# Patient Record
Sex: MALE | Race: WHITE | URBAN - METROPOLITAN AREA
[De-identification: names, ages, dates, MRNs, and addresses within clinical notes are randomized per-mention and may not be internally consistent; named-entity substitution may affect disease eponyms.]

---

## 2017-02-23 PROBLEM — M79.605 PAIN IN BOTH LOWER EXTREMITIES: Status: ACTIVE | Noted: 2017-02-23

## 2017-02-23 PROBLEM — M79.604 PAIN IN BOTH LOWER EXTREMITIES: Status: ACTIVE | Noted: 2017-02-23

## 2019-06-20 ENCOUNTER — OFFICE VISIT (OUTPATIENT)
Dept: PRIMARY CARE CLINIC | Age: 39
End: 2019-06-20
Payer: COMMERCIAL

## 2019-06-20 VITALS
WEIGHT: 204.8 LBS | SYSTOLIC BLOOD PRESSURE: 116 MMHG | DIASTOLIC BLOOD PRESSURE: 78 MMHG | BODY MASS INDEX: 27.02 KG/M2 | HEART RATE: 62 BPM | OXYGEN SATURATION: 98 %

## 2019-06-20 DIAGNOSIS — F43.9 SITUATIONAL STRESS: ICD-10-CM

## 2019-06-20 DIAGNOSIS — F43.23 ADJUSTMENT DISORDER WITH MIXED ANXIETY AND DEPRESSED MOOD: ICD-10-CM

## 2019-06-20 PROCEDURE — 99213 OFFICE O/P EST LOW 20 MIN: CPT | Performed by: PHYSICIAN ASSISTANT

## 2019-06-20 ASSESSMENT — ENCOUNTER SYMPTOMS
WHEEZING: 0
VOMITING: 0
DIARRHEA: 0
CHEST TIGHTNESS: 1
BACK PAIN: 0
NAUSEA: 0
CONSTIPATION: 0
SHORTNESS OF BREATH: 0

## 2019-06-20 ASSESSMENT — PATIENT HEALTH QUESTIONNAIRE - PHQ9
SUM OF ALL RESPONSES TO PHQ9 QUESTIONS 1 & 2: 0
SUM OF ALL RESPONSES TO PHQ QUESTIONS 1-9: 0
SUM OF ALL RESPONSES TO PHQ QUESTIONS 1-9: 0
1. LITTLE INTEREST OR PLEASURE IN DOING THINGS: 0
2. FEELING DOWN, DEPRESSED OR HOPELESS: 0

## 2019-06-20 NOTE — PROGRESS NOTES
[facility] PRIMARY CARE  [address] [address] [facility]  Dept: [phone]    Chase Rose is a 34 y.o. male who presents today for his medical conditions/complaintsas noted below. Chief Complaint   Patient presents with    Anxiety     has been dealing with this for 6-7 months. states wife has it, he feels he sees the signs in himself now. HPI:     HPI  Patient of Dr. Vincent Kin seen today with newly recognized anxiety symptoms. 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 Tried his wife's generic Zoloft 100mg just one pill and is convinced that it helped him tremendously. Scanned surveys do show depression and anxiety. He states he lacks confidence, has leg and stomach tightening, can't focus and mind is always thinking about \" a million things\" . Denies self treatment with ETOH or rec drugs. Denies suicidal ideation. LDL Calculated (mg/dL)   Date Value   09/20/2018 135 (H)       (goal LDL is <100)   BUN (mg/dL)   Date Value   09/20/2018 23     BP Readings from Last 3 Encounters:   06/20/19 116/78   12/29/17 124/76   04/06/17 110/80          (goal 120/80)    No past medical history on file.    Past Surgical History:   Procedure Laterality Date    HERNIA REPAIR  09/2017       Family History   Problem Relation Age of Onset    High Cholesterol Father     High Cholesterol Brother     Cancer Paternal Grandfather        Social History     Tobacco Use    Smoking status: Never Smoker    Smokeless tobacco: Never Used   Substance Use Topics    Alcohol use: Yes     Comment: weekends      Current Outpatient Medications   Medication Sig Dispense Refill    sertraline (ZOLOFT) 50 MG tablet Take one tablet qhs for 1 week then 2 tablets qhs 60 tablet 0    Multiple Vitamins-Minerals (THERAPEUTIC MULTIVITAMIN-MINERALS) tablet Take 1 tablet by mouth daily       No current facility-administered medications for this visit. Allergies   Allergen Reactions    Lactose Intolerance (Gi)        Health Maintenance   Topic Date Due    HIV screen  09/18/1993    Flu vaccine (Season Ended) 09/01/2019    Lipid screen  09/20/2023    DTaP/Tdap/Td vaccine (2 - Td) 10/05/2028    Pneumococcal 0-64 years Vaccine  Aged Out       Subjective:      Review of Systems   Constitutional: Negative for activity change, appetite change and fatigue. Respiratory: Positive for chest tightness. Negative for shortness of breath and wheezing. Cardiovascular: Negative for chest pain and palpitations. Gastrointestinal: Negative for constipation, diarrhea, nausea and vomiting. Endocrine: Negative for cold intolerance and heat intolerance. Musculoskeletal: Positive for arthralgias and myalgias. Negative for back pain and neck pain. Neurological: Negative for dizziness, seizures, syncope, weakness, light-headedness and headaches. Psychiatric/Behavioral: Positive for dysphoric mood. Negative for agitation, behavioral problems, confusion, decreased concentration, hallucinations, self-injury, sleep disturbance and suicidal ideas. The patient is nervous/anxious. The patient is not hyperactive. Objective:     /78   Pulse 62   Wt 204 lb 12.8 oz (92.9 kg)   SpO2 98%   BMI 27.02 kg/m²   Physical Exam   Constitutional: He appears well-developed and well-nourished. No distress. Cardiovascular: Normal rate, regular rhythm and normal heart sounds. Pulmonary/Chest: Effort normal and breath sounds normal.   Vitals reviewed. Assessment:       Diagnosis Orders   1. Situational stress     2. Adjustment disorder with mixed anxiety and depressed mood          Plan:    Start Zoloft 50mg 1 po qhs and after 1 week, increase to 100mg   Get back to regular exercise   Consider counseling but he wants to wait.     Return in about 1 month (around 7/20/2019), or if symptoms worsen or fail to improve, for with me or Dr. Matias Wheeler. No orders of the defined types were placed in this encounter. Orders Placed This Encounter   Medications    sertraline (ZOLOFT) 50 MG tablet     Sig: Take one tablet qhs for 1 week then 2 tablets qhs     Dispense:  60 tablet     Refill:  0       Patient given educationalmaterials - see patient instructions. Discussed use, benefit, and side effectsof prescribed medications. All patient questions answered. Pt voiced understanding. Reviewed health maintenance. Instructed to continue current medications, diet andexercise. Patient agreed with treatment plan. Follow up as directed.      Electronicallysigned by Glenis Florez PA-C on 6/20/2019 at 11:16 AM

## 2019-07-16 ENCOUNTER — OFFICE VISIT (OUTPATIENT)
Dept: PRIMARY CARE CLINIC | Age: 39
End: 2019-07-16
Payer: COMMERCIAL

## 2019-07-16 VITALS
SYSTOLIC BLOOD PRESSURE: 116 MMHG | WEIGHT: 203.4 LBS | BODY MASS INDEX: 26.84 KG/M2 | OXYGEN SATURATION: 98 % | HEART RATE: 62 BPM | DIASTOLIC BLOOD PRESSURE: 84 MMHG

## 2019-07-16 DIAGNOSIS — F43.23 ADJUSTMENT DISORDER WITH MIXED ANXIETY AND DEPRESSED MOOD: ICD-10-CM

## 2019-07-16 DIAGNOSIS — Z79.899 MEDICATION MANAGEMENT: Primary | ICD-10-CM

## 2019-07-16 DIAGNOSIS — F43.9 SITUATIONAL STRESS: ICD-10-CM

## 2019-07-16 PROCEDURE — 99213 OFFICE O/P EST LOW 20 MIN: CPT | Performed by: PHYSICIAN ASSISTANT

## 2019-07-16 ASSESSMENT — ENCOUNTER SYMPTOMS
NAUSEA: 0
VOMITING: 0
BACK PAIN: 0
SHORTNESS OF BREATH: 0

## 2019-07-16 NOTE — PROGRESS NOTES
chest pain. Gastrointestinal: Negative for nausea and vomiting. Endocrine: Negative for cold intolerance and heat intolerance. Musculoskeletal: Negative for arthralgias, back pain, myalgias and neck pain. Neurological: Negative for dizziness, seizures, syncope, weakness, light-headedness and headaches. Psychiatric/Behavioral: Negative for agitation, behavioral problems, confusion, decreased concentration, dysphoric mood, hallucinations, self-injury, sleep disturbance and suicidal ideas. The patient is not nervous/anxious and is not hyperactive. Objective:     /84   Pulse 62   Wt 203 lb 6.4 oz (92.3 kg)   SpO2 98%   BMI 26.84 kg/m²   Physical Exam   Constitutional: He is oriented to person, place, and time. He appears well-developed and well-nourished. No distress. Cardiovascular: Normal rate, regular rhythm and normal heart sounds. Pulmonary/Chest: Effort normal and breath sounds normal.   Neurological: He is alert and oriented to person, place, and time. Psychiatric: He has a normal mood and affect. His speech is normal and behavior is normal. Judgment and thought content normal. Cognition and memory are normal.   Vitals reviewed. Assessment:       Diagnosis Orders   1. Medication management     2. Situational stress     3. Adjustment disorder with mixed anxiety and depressed mood          Plan:    Continue the Zoloft daily for 6 months and will review at that time. Return if symptoms worsen or fail to improve. No orders of the defined types were placed in this encounter. Orders Placed This Encounter   Medications    sertraline (ZOLOFT) 50 MG tablet     Sig: Take 2 tablets by mouth every evening     Dispense:  60 tablet     Refill:  5       Patient given educationalmaterials - see patient instructions. Discussed use, benefit, and side effectsof prescribed medications. All patient questions answered. Pt voiced understanding. Reviewed health maintenance.   Instructed to

## 2020-01-16 ENCOUNTER — OFFICE VISIT (OUTPATIENT)
Dept: PRIMARY CARE CLINIC | Age: 40
End: 2020-01-16
Payer: COMMERCIAL

## 2020-01-16 VITALS
SYSTOLIC BLOOD PRESSURE: 118 MMHG | HEIGHT: 73 IN | BODY MASS INDEX: 27.73 KG/M2 | WEIGHT: 209.2 LBS | HEART RATE: 65 BPM | OXYGEN SATURATION: 97 % | DIASTOLIC BLOOD PRESSURE: 76 MMHG

## 2020-01-16 PROCEDURE — 99213 OFFICE O/P EST LOW 20 MIN: CPT | Performed by: PHYSICIAN ASSISTANT

## 2020-01-16 RX ORDER — SERTRALINE HYDROCHLORIDE 100 MG/1
100 TABLET, FILM COATED ORAL EVERY EVENING
Qty: 30 TABLET | Refills: 11 | Status: SHIPPED | OUTPATIENT
Start: 2020-01-16 | End: 2021-09-30 | Stop reason: SDUPTHER

## 2020-01-16 ASSESSMENT — PATIENT HEALTH QUESTIONNAIRE - PHQ9
SUM OF ALL RESPONSES TO PHQ QUESTIONS 1-9: 2
SUM OF ALL RESPONSES TO PHQ9 QUESTIONS 1 & 2: 2
1. LITTLE INTEREST OR PLEASURE IN DOING THINGS: 1
SUM OF ALL RESPONSES TO PHQ QUESTIONS 1-9: 2
2. FEELING DOWN, DEPRESSED OR HOPELESS: 1

## 2020-01-16 ASSESSMENT — ENCOUNTER SYMPTOMS
RESPIRATORY NEGATIVE: 1
RHINORRHEA: 0
SINUS PAIN: 0
SINUS PRESSURE: 0
SORE THROAT: 0
EYES NEGATIVE: 1

## 2020-01-16 NOTE — PROGRESS NOTES
[address] [facility] CARE  [address] [place] [id number]  Dept: [phone] is a 37 y.o. male who presents today for his medical conditions/complaintsas noted below. Chief Complaint   Patient presents with    6 Month Follow-Up     patient said the Zoloft is working well for him. HPI:     HPI  Has an up and down feeling when he forgets the pill which is not often. He wants to continue Zoloft     Having pressure in right ear. Hearing is ok. Had a viral illness over the Christmas holiday. No pain or fever. No discharge      LDL Calculated (mg/dL)   Date Value   09/20/2018 135 (H)       (goal LDL is <100)   BUN (mg/dL)   Date Value   09/20/2018 23     BP Readings from Last 3 Encounters:   01/16/20 118/76   07/16/19 116/84   06/20/19 116/78          (goal 120/80)    No past medical history on file. Past Surgical History:   Procedure Laterality Date    HERNIA REPAIR  09/2017       Family History   Problem Relation Age of Onset    High Cholesterol Father     High Cholesterol Brother     Cancer Paternal Grandfather        Social History     Tobacco Use    Smoking status: Never Smoker    Smokeless tobacco: Never Used   Substance Use Topics    Alcohol use: Yes     Comment: weekends      Current Outpatient Medications   Medication Sig Dispense Refill    sertraline (ZOLOFT) 100 MG tablet Take 1 tablet by mouth every evening 30 tablet 11    Multiple Vitamins-Minerals (THERAPEUTIC MULTIVITAMIN-MINERALS) tablet Take 1 tablet by mouth daily       No current facility-administered medications for this visit.       Allergies   Allergen Reactions    Lactose Intolerance (Gi)        Health Maintenance   Topic Date Due    HIV screen  09/18/1993    Flu vaccine (1) 01/16/2021 (Originally 9/1/2019)    Lipid screen  09/20/2023    DTaP/Tdap/Td vaccine (2 - Td) 10/05/2028    Pneumococcal 0-64 years Vaccine  Aged Out       Subjective:      Review of Systems   Constitutional: Negative for chills, diaphoresis and fever. HENT: Positive for ear pain. Negative for congestion, dental problem, ear discharge, postnasal drip, rhinorrhea, sinus pressure, sinus pain, sore throat and tinnitus. Hearing loss: right feels plugged. Eyes: Negative. Respiratory: Negative. Cardiovascular: Negative. Neurological: Negative for dizziness, light-headedness and headaches. Psychiatric/Behavioral: Negative. Objective:     /76   Pulse 65   Ht 6' 1\" (1.854 m)   Wt 209 lb 3.2 oz (94.9 kg)   SpO2 97%   BMI 27.60 kg/m²   Physical Exam  Vitals signs and nursing note reviewed. HENT:      Right Ear: Ear canal and external ear normal. A middle ear effusion is present. Tympanic membrane is not injected, perforated, erythematous, retracted or bulging. Left Ear: Tympanic membrane, ear canal and external ear normal.   Cardiovascular:      Rate and Rhythm: Normal rate and regular rhythm. Heart sounds: Normal heart sounds. Pulmonary:      Effort: Pulmonary effort is normal.      Breath sounds: Normal breath sounds. Psychiatric:         Mood and Affect: Mood normal.         Behavior: Behavior normal.         Thought Content: Thought content normal.         Judgment: Judgment normal.         Assessment:       Diagnosis Orders   1. Medication management     2. Adjustment disorder with mixed anxiety and depressed mood     3. Situational stress     4. Ear pressure, right          Plan:    List of counselors given--suggested Salud Webb, PhD  Refilled Zoloft   Take Claritin daily for 2-3 weeks and if ear is not improving, RTO. Return in about 1 year (around 1/16/2021) for Physical.    No orders of the defined types were placed in this encounter.     Orders Placed This Encounter   Medications    sertraline (ZOLOFT) 100 MG tablet     Sig: Take 1 tablet by mouth every evening     Dispense:  30 tablet     Refill:  11       Patient given educationalmaterials - see patient instructions. Discussed use, benefit, and side effectsof prescribed medications. All patient questions answered. Pt voiced understanding. Reviewed health maintenance. Instructed to continue current medications, diet andexercise. Patient agreed with treatment plan. Follow up as directed.      Electronicallysigned by Emerita Webb PA-C on 1/16/2020 at 10:47 AM

## 2021-01-28 RX ORDER — SERTRALINE HYDROCHLORIDE 100 MG/1
100 TABLET, FILM COATED ORAL EVERY EVENING
Qty: 30 TABLET | Refills: 11 | OUTPATIENT
Start: 2021-01-28

## 2021-09-22 ENCOUNTER — OFFICE VISIT (OUTPATIENT)
Dept: PRIMARY CARE CLINIC | Age: 41
End: 2021-09-22
Payer: COMMERCIAL

## 2021-09-22 VITALS
DIASTOLIC BLOOD PRESSURE: 86 MMHG | HEIGHT: 73 IN | HEART RATE: 57 BPM | WEIGHT: 211.8 LBS | SYSTOLIC BLOOD PRESSURE: 136 MMHG | BODY MASS INDEX: 28.07 KG/M2 | OXYGEN SATURATION: 97 %

## 2021-09-22 DIAGNOSIS — M54.6 ACUTE RIGHT-SIDED THORACIC BACK PAIN: ICD-10-CM

## 2021-09-22 DIAGNOSIS — M54.12 CERVICAL RADICULOPATHY: Primary | ICD-10-CM

## 2021-09-22 PROCEDURE — 99213 OFFICE O/P EST LOW 20 MIN: CPT | Performed by: PHYSICIAN ASSISTANT

## 2021-09-22 RX ORDER — CYCLOBENZAPRINE HCL 5 MG
5 TABLET ORAL 3 TIMES DAILY PRN
Qty: 30 TABLET | Refills: 0 | Status: SHIPPED | OUTPATIENT
Start: 2021-09-22 | End: 2021-10-02

## 2021-09-22 RX ORDER — METHYLPREDNISOLONE 4 MG/1
TABLET ORAL
Qty: 1 KIT | Refills: 0 | Status: SHIPPED | OUTPATIENT
Start: 2021-09-22 | End: 2021-09-28

## 2021-09-22 SDOH — ECONOMIC STABILITY: FOOD INSECURITY: WITHIN THE PAST 12 MONTHS, YOU WORRIED THAT YOUR FOOD WOULD RUN OUT BEFORE YOU GOT MONEY TO BUY MORE.: NEVER TRUE

## 2021-09-22 SDOH — ECONOMIC STABILITY: FOOD INSECURITY: WITHIN THE PAST 12 MONTHS, THE FOOD YOU BOUGHT JUST DIDN'T LAST AND YOU DIDN'T HAVE MONEY TO GET MORE.: NEVER TRUE

## 2021-09-22 ASSESSMENT — PATIENT HEALTH QUESTIONNAIRE - PHQ9
SUM OF ALL RESPONSES TO PHQ QUESTIONS 1-9: 0
SUM OF ALL RESPONSES TO PHQ QUESTIONS 1-9: 0
SUM OF ALL RESPONSES TO PHQ9 QUESTIONS 1 & 2: 0
2. FEELING DOWN, DEPRESSED OR HOPELESS: 0
1. LITTLE INTEREST OR PLEASURE IN DOING THINGS: 0
SUM OF ALL RESPONSES TO PHQ QUESTIONS 1-9: 0

## 2021-09-22 ASSESSMENT — SOCIAL DETERMINANTS OF HEALTH (SDOH): HOW HARD IS IT FOR YOU TO PAY FOR THE VERY BASICS LIKE FOOD, HOUSING, MEDICAL CARE, AND HEATING?: NOT HARD AT ALL

## 2021-09-22 NOTE — PROGRESS NOTES
[address] [facility] PRIMARY CARE  [address] [address]  Dept: [phone]ardo is a 35 y.o. male Established patient, who presents today for his medical conditions/complaints as noted below. Chief Complaint   Patient presents with    Neck Pain     x 1 1/2 weeks       HPI:     HPI   Refused influenza vaccine. Denies previous hx of neck pain. Had pain for 1.5 weeks now. He is a  and thinks the pain may be from painting high overhead areas. Pain is on the right side of his neck and goes to the posterior Rt shoulder area. Can't get comfortable. Says his Rt thumb goes numb sometimes. C/o decreased neck ROM. Right hand dominant. Noticed pain increases when he is holding things in his right hand. Saw a masseuse and chiropractor without relief. Topical cream from the chiropractor did not help either. Heat helps, but cold does not. Taking ibuprofen 600 mg every 6-8 hrs, which helps. Reviewed prior notes None  Reviewed previous Labs- BMP from 2018    LDL Calculated (mg/dL)   Date Value   09/20/2018 135 (H)       (goal LDL is <100)   BUN (mg/dL)   Date Value   09/20/2018 23     TSH (uIU/mL)   Date Value   09/20/2018 1.12     BP Readings from Last 3 Encounters:   09/22/21 136/86   01/16/20 118/76   07/16/19 116/84          (goal 120/80)    No past medical history on file.    Past Surgical History:   Procedure Laterality Date    HERNIA REPAIR  09/2017       Family History   Problem Relation Age of Onset    High Cholesterol Father     High Cholesterol Brother     Cancer Paternal Grandfather        Social History     Tobacco Use    Smoking status: Never Smoker    Smokeless tobacco: Never Used   Substance Use Topics    Alcohol use: Yes     Comment: weekends      Current Outpatient Medications   Medication Sig Dispense Refill    methylPREDNISolone (MEDROL, RON,) 4 MG tablet Take as directed 1 kit 0    cyclobenzaprine (FLEXERIL) 5 MG tablet Take 1 tablet by mouth 3 times daily as needed for Muscle spasms 30 tablet 0    sertraline (ZOLOFT) 100 MG tablet Take 1 tablet by mouth every evening 30 tablet 11    Multiple Vitamins-Minerals (THERAPEUTIC MULTIVITAMIN-MINERALS) tablet Take 1 tablet by mouth daily       No current facility-administered medications for this visit. Allergies   Allergen Reactions    Lactose Intolerance (Gi)        Health Maintenance   Topic Date Due    Hepatitis C screen  Never done    COVID-19 Vaccine (1) Never done    HIV screen  Never done    Diabetes screen  Never done    Flu vaccine (1) 09/28/2022 (Originally 9/1/2021)    Lipid screen  09/20/2023    DTaP/Tdap/Td vaccine (2 - Td or Tdap) 10/05/2028    Hepatitis A vaccine  Aged Out    Hepatitis B vaccine  Aged Out    Hib vaccine  Aged Out    Meningococcal (ACWY) vaccine  Aged Out    Pneumococcal 0-64 years Vaccine  Aged Out       Subjective:      Review of Systems   Musculoskeletal: Positive for neck pain. Neurological: Positive for numbness (right thumb). Objective:     /86   Pulse 57   Ht 6' 1\" (1.854 m)   Wt 211 lb 12.8 oz (96.1 kg)   SpO2 97%   BMI 27.94 kg/m²   Physical Exam  Vitals and nursing note reviewed. Constitutional:       Appearance: Normal appearance. HENT:      Head: Normocephalic and atraumatic. Cardiovascular:      Rate and Rhythm: Regular rhythm. Heart sounds: Normal heart sounds. Pulmonary:      Effort: Pulmonary effort is normal.      Breath sounds: Normal breath sounds. Musculoskeletal:      Right shoulder: Bony tenderness (on right scapula, but not on the [facility] joint) present. Normal range of motion. Cervical back: No bony tenderness. Thoracic back: No bony tenderness. Back:       Comments: No pain with shoulder ROM. Negative Spurling  Equil  strength   Neurological:      Mental Status: He is alert. Assessment/Plan:   1. Cervical radiculopathy/ 2.  Acute right-sided thoracic back pain  -     methylPREDNISolone (MEDROL, RON,) 4 MG tablet; Take as directed, Disp-1 kit, R-0Normal  -     cyclobenzaprine (FLEXERIL) 5 MG tablet; Take 1 tablet by mouth 3 times daily as needed for Muscle spasms, Disp-30 tablet, R-0Normal   -Continue motrin 600 mg OTC and I suggested he alternate that with Tylenol for pain. -Recommended referral to physical therapy, but pt wanted to see if it improved with the medrol dose pack first.     Return if symptoms worsen or fail to improve. No orders of the defined types were placed in this encounter. Orders Placed This Encounter   Medications    methylPREDNISolone (MEDROL, RON,) 4 MG tablet     Sig: Take as directed     Dispense:  1 kit     Refill:  0    cyclobenzaprine (FLEXERIL) 5 MG tablet     Sig: Take 1 tablet by mouth 3 times daily as needed for Muscle spasms     Dispense:  30 tablet     Refill:  0       Patient given educational materials - see patient instructions. Discussed use, benefit, and side effects of prescribed medications. All patient questions answered. Pt voiced understanding. Reviewed health maintenance. Instructed to continue current medications, diet and exercise. Patient agreed with treatment plan. Follow up as directed.      Electronically signed by Shaunna Bunn PA-C on 9/28/2021 at 11:09 PM

## 2021-09-22 NOTE — PATIENT INSTRUCTIONS
Patient Education        Pinched Nerve in the Neck: Care Instructions  Your Care Instructions  A pinched nerve in the neck happens when a vertebra or disc in the upper part of your spine is damaged. This damage can happen because of an injury. Or it can just happen with age. The changes caused by the damage may put pressure on a nearby nerve root, pinching it. This causes symptoms such as sharp pain in your neck, shoulder, arm, hand, or back. You may also have tingling or numbness. Sometimes it makes your arm weaker. The symptoms are usually worse when you turn your head or strain your neck. For many people, the symptoms get better over time and finally go away. Early treatment usually includes medicines for pain and swelling. Sometimes physical therapy and special exercises may help. Follow-up care is a key part of your treatment and safety. Be sure to make and go to all appointments, and call your doctor if you are having problems. It's also a good idea to know your test results and keep a list of the medicines you take. How can you care for yourself at home? · Be safe with medicines. Read and follow all instructions on the label. ? If the doctor gave you a prescription medicine for pain, take it as prescribed. ? If you are not taking a prescription pain medicine, ask your doctor if you can take an over-the-counter medicine. · Try using a heating pad on a low or medium setting for 15 to 20 minutes every 2 or 3 hours. Try a warm shower in place of one session with the heating pad. You can also buy single-use heat wraps that last up to 8 hours. · You can also try an ice pack for 10 to 15 minutes every 2 to 3 hours. There isn't strong evidence that either heat or ice will help. But you can try them to see if they help you. · Don't spend too long in one position. Take short breaks to move around and change positions. · Wear a seat belt and shoulder harness when you are in a car.   · Sleep with a pillow under your head and neck that keeps your neck straight. · If you were given a neck brace (cervical collar) to limit neck motion, wear it as instructed for as many days as your doctor tells you to. Do not wear it longer than you were told to. Wearing a brace for too long can lead to neck stiffness and can weaken the neck muscles. · Follow your doctor's instructions for gentle neck-stretching exercises. · Do not smoke. Smoking can slow healing of your discs. If you need help quitting, talk to your doctor about stop-smoking programs and medicines. These can increase your chances of quitting for good. · Avoid strenuous work or exercise until your doctor says it is okay. When should you call for help? Call 911 anytime you think you may need emergency care. For example, call if:    · You are unable to move an arm or a leg at all. Call your doctor now or seek immediate medical care if:    · You have new or worse symptoms in your arms, legs, chest, belly, or buttocks. Symptoms may include:  ? Numbness or tingling. ? Weakness. ? Pain.     · You lose bladder or bowel control. Watch closely for changes in your health, and be sure to contact your doctor if:    · You are not getting better as expected. Where can you learn more? Go to https://Artabase.[employer]. org and sign in to your theAudience account. Enter T065 in the [facility] box to learn more about \"Pinched Nerve in the Neck: Care Instructions. \"     If you do not have an account, please click on the \"Sign Up Now\" link. Current as of: November 16, 2020               Content Version: 12.9  © 7052-0019 [employer]. Care instructions adapted under license by [facility] ([facility]). If you have questions about a medical condition or this instruction, always ask your healthcare professional. [place] [address] any warranty or liability for your use of this information.          Patient Education        Neck Spasm: Exercises  Introduction  Here are some examples of exercises for you to try. The exercises may be suggested for a condition or for rehabilitation. Start each exercise slowly. Ease off the exercises if you start to have pain. You will be told when to start these exercises and which ones will work best for you. How to do the exercises  Levator scapula stretch   1. Sit in a firm chair, or stand up straight. 2. Gently tilt your head toward your left shoulder. 3. Turn your head to look down into your armpit, bending your head slightly forward. Let the weight of your head stretch your neck muscles. 4. Hold for 15 to 30 seconds. 5. Return to your starting position. 6. Follow the same instructions above, but tilt your head toward your right shoulder. 7. Repeat 2 to 4 times toward each shoulder. Upper trapezius stretch   1. Sit in a firm chair, or stand up straight. 2. This stretch works best if you keep your shoulder down as you lean away from it. To help you remember to do this, start by relaxing your shoulders and lightly holding on to your thighs or your chair. 3. Tilt your head toward your shoulder and hold for 15 to 30 seconds. Let the weight of your head stretch your muscles. 4. If you would like a little added stretch, place your arm behind your back. Use the arm opposite of the direction you are tilting your head. For example, if you are tilting your head to the left, place your right arm behind your back. 5. Repeat 2 to 4 times toward each shoulder. Neck rotation   1. Sit in a firm chair, or stand up straight. 2. Keeping your chin level, turn your head to the right, and hold for 15 to 30 seconds. 3. Turn your head to the left, and hold for 15 to 30 seconds. 4. Repeat 2 to 4 times to each side. Chin tuck   1. Lie on the floor with a rolled-up towel under your neck. Your head should be touching the floor. 2. Slowly bring your chin toward the front of your neck.   3. Hold for a count of 6, and then relax for up to 10 seconds. 4. Repeat 8 to 12 times. Forward neck flexion   1. Sit in a firm chair, or stand up straight. 2. Bend your head forward. 3. Hold for 15 to 30 seconds, then return to your starting position. 4. Repeat 2 to 4 times. Follow-up care is a key part of your treatment and safety. Be sure to make and go to all appointments, and call your doctor if you are having problems. It's also a good idea to know your test results and keep a list of the medicines you take. Where can you learn more? Go to https://FrameBlastpeTwitChateb.[employer]. org and sign in to your [employer] account. Enter P962 in the ScoreBig box to learn more about \"Neck Spasm: Exercises. \"     If you do not have an account, please click on the \"Sign Up Now\" link. Current as of: November 16, 2020               Content Version: 12.9  © 2006-2021 [employer]. Care instructions adapted under license by [facility] ([facility]). If you have questions about a medical condition or this instruction, always ask your healthcare professional. [place] [address] any warranty or liability for your use of this information.

## 2021-09-30 ENCOUNTER — TELEMEDICINE (OUTPATIENT)
Dept: PRIMARY CARE CLINIC | Age: 41
End: 2021-09-30
Payer: COMMERCIAL

## 2021-09-30 DIAGNOSIS — M54.12 CERVICAL RADICULOPATHY: Primary | ICD-10-CM

## 2021-09-30 PROCEDURE — 99213 OFFICE O/P EST LOW 20 MIN: CPT | Performed by: PHYSICIAN ASSISTANT

## 2021-09-30 RX ORDER — SERTRALINE HYDROCHLORIDE 100 MG/1
100 TABLET, FILM COATED ORAL EVERY EVENING
Qty: 30 TABLET | Refills: 3 | Status: SHIPPED | OUTPATIENT
Start: 2021-09-30

## 2021-09-30 NOTE — PROGRESS NOTES
Maintenance   Topic Date Due    Hepatitis C screen  Never done    COVID-19 Vaccine (1) Never done    HIV screen  Never done    Diabetes screen  Never done    Flu vaccine (1) 09/28/2022 (Originally 9/1/2021)    Lipid screen  09/20/2023    DTaP/Tdap/Td vaccine (2 - Td or Tdap) 10/05/2028    Hepatitis A vaccine  Aged Out    Hepatitis B vaccine  Aged Out    Hib vaccine  Aged Out    Meningococcal (ACWY) vaccine  Aged Out    Pneumococcal 0-64 years Vaccine  Aged Out       Subjective:      Review of Systems   Musculoskeletal: Positive for neck pain. Neurological: Positive for numbness (right thumb). Objective: There were no vitals taken for this visit. Physical Exam  Vitals and nursing note reviewed. Constitutional:       Appearance: Normal appearance. HENT:      Head: Normocephalic and atraumatic. Neck:     Pulmonary:      Effort: Pulmonary effort is normal. No respiratory distress. Neurological:      Mental Status: He is alert. Assessment/Plan:   1. Cervical radiculopathy  -     [facility] [place]   -Pt wanted to hold off on cervical spine xray for now. JeremyrFeliciano, was evaluated through a synchronous (real-time) audio-video encounter. The patient (or guardian if applicable) is aware that this is a billable service. Verbal consent to proceed has been obtained within the past 12 months. The visit was conducted pursuant to the emergency declaration under the [address] [place] [address], [address] [place] [address] authority and the [employer] General Act. Patient identification was verified, and a caregiver was present when appropriate. The patient was located in a state where the provider was credentialed to provide care. --Yvette Sargent PA-C on 10/7/2021 at 12:23 AM    An electronic signature was used to authenticate this note. Return if symptoms worsen or fail to improve.     Orders Placed This Encounter   Procedures    [facility]  [place]     Referral Priority:   Routine     Referral Type:   Eval and Treat     Referral Reason:   Specialty Services Required     Requested Specialty:   Physical Therapy     Number of Visits Requested:   1     Orders Placed This Encounter   Medications    sertraline (ZOLOFT) 100 MG tablet     Sig: Take 1 tablet by mouth every evening     Dispense:  30 tablet     Refill:  3       Patient given educational materials - see patient instructions. Discussed use, benefit, and side effects of prescribed medications. All patient questions answered. Pt voiced understanding. Reviewed health maintenance. Instructed to continue current medications, diet and exercise. Patient agreed with treatment plan. Follow up as directed.      Electronically signed by Delisa Dyson PA-C on 10/7/2021 at 12:23 AM

## 2021-10-07 ENCOUNTER — HOSPITAL ENCOUNTER (OUTPATIENT)
Dept: PHYSICAL THERAPY | Facility: CLINIC | Age: 41
Discharge: HOME | End: 2021-10-07
Payer: COMMERCIAL

## 2021-10-07 PROCEDURE — 97110 THERAPEUTIC EXERCISES: CPT

## 2021-10-07 PROCEDURE — 97161 PT EVAL LOW COMPLEX 20 MIN: CPT

## 2021-10-07 PROCEDURE — 97140 MANUAL THERAPY 1/> REGIONS: CPT

## 2021-10-07 NOTE — CONSULTS
[] [facility] [facility]  [address]  [phone]  F: [phone] [] [address]  [place] [address]   [address]  P: [phone]  F: [phone] [x] [place]  [address]  P: [phone]  F: [phone] [] [address]  P: [phone]  F: [phone] [] [address]  [facility]   [address]   [place]: [phone]  F: [phone]      Physical Therapy Spine Evaluation    Date:  10/7/2021  Patient: Bunny Stover  : 1980  MRN: [id number]  Physician: CIERA Pierson   Insurance: [employer] (insurance pending)  Medical Diagnosis: Cervical radiculopathy  Rehab Codes: Onset Date: 9/15/21  Next 's appt.: unknown    Subjective:   CC: Per PA note: HPI   Pain is still on right side of neck. Says he no longer feels pain in the Rt scapular area though. It still is radiating into Rt arm with right thumb numbness. Says the Medrol dose pack only helped while he was on it. Flexeril didn't help. Taking ibuprofen 600-800 mg, and it does help. He is still applying heat. 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      PMHx: [] Unremarkable [] Diabetes [] HTN  [] Pacemaker   [] MI/Heart Problems [] Cancer [] Arthritis [] Other:              [x] Refer to full medical chart  In EPIC         Tests: [] X-Ray: [] MRI:  [] Other:    Medications: [x] Refer to full medical record [] None [] Other:  Allergies:      [x] Refer to full medical record [] None [] Other:    Function:  Hand Dominance  [x] Right  [] Left  Patient lives with:  Wife and 2 kids 11 and 8yrs old   Employer Self Employed    Job Status [x]  Normal duty   [] Light duty   [] Off due to condition    []  Retired   [] Not employed   [] Disability  [] Other:  []  Return to work: Work activities/duties Lift, push, pull, paint, climb     Pain:  [x] Yes  [] No Location:Neck and L UE C6 dermatomal pattern Pain Rating: (0-10 scale) 6/10  Pain altered Tx:  [] Yes  [] No  Action:    Symptoms:  [] Improving [] Worsening [] Same  Better:  [] AM    [] PM    [] Sit    [] Rise/Sit    []Stand    [] Walk    [] Lying    [] Other:  Worse: [] AM    [] PM    [x] Sit    [] Rise/Sit    []Stand    [] Walk    [] Lying    [x] Bend  forward                   [] Valsalva    [] Other:  Sleep: [] OK    [x] Disturbed    Objective:      STRENGTH  ROM    Left Right Cervical    C5 Shld Abd 5 5 Flexion 30 Pain   Shld Flexion 5 5 Extension 30 Pain   Shld IR 5 5 Rotation L 40 R 70   Shld ER 4+ 4+      C6 Elb Flex 5 5 Retraction 50% limited   C7 Elb Ext 5 5     Wrist flex 5 5              Ext 5 5       Lizzie Tests ? Pain ? Pain No Change Not Tested   Rep Retract [] [x] [] []       OBSERVATION No Deficit Deficit Not Tested Comments   Posture       Forward Head [] [x] []    Rounded Shoulders [] [x] []    Tspine/ribs [] [x] [] FRSL T2-4, R posterior rib 4-8, R 1st rib elevated   Palpation [] [x] [] R pect minor, cervical paraspinals, UT, Levator , lat spasm   Sensation [] [x] [] C6 dermatomal paresthesia described       Functional Test: modified oswestry Score: 68% functionally impaired     Comments:    Assessment:  Patient would benefit from skilled physical therapy services in order to: Centralize pain to neck and resolve as well as restore full cervical AROM all planes and correct posture    Problems:    [x] ? Pain:  [x] ? ROM:  [] ? Strength:  [x] ? Function:  [] Other:      STG: (to be met in 10 treatments)  1. ? Pain:Centralize pain to neck and <3/10  2. ? ROM: Normal cervical ROM all planes  3. ? Function:Pt able to continue working with <3/10 pain  4.  Patient to be independent with home exercise program as demonstrated by performance with correct form without cues. LTG: (to be met in 20 treatments)  1. 0/10 pain to allow pt to perform all desired activities without restriction  2. FOM <10% functional limitation      Patient goals: Bring pain back to its source and resolve    Rehab Potential:  [x] Good  [] Fair  [] Poor   Suggested Professional Referral:  [x] No  [] Yes:  Barriers to Goal Achievement:  [x] No  [] Yes:  Domestic Concerns:  [x] No  [] Yes:    Pt. Education:  [x] Plans/Goals, Risks/Benefits discussed  [x] Home exercise program  Method of Education: [x] Verbal  [] Demo  [x] Written as below as well as finding position of comfort   Access Code: [email or web address]  URL: [email or web address]  Date: 10/07/2021  Prepared by: Deena Butler    Exercises  Cervical Retraction at Wall - 1 x daily - 7 x weekly - 3 sets - 10 reps  Prone Cervical Retraction Off Table - 1 x daily - 7 x weekly - 3 sets - 10 reps    Comprehension of Education:  [x] Verbalizes understanding. [] Demonstrates understanding. [] Needs Review. [] Demonstrates/verbalizes understanding of HEP/Ed previously given.     Treatment Plan:  [x] Therapeutic Exercise   38566  [] Iontophoresis: 4 mg/mL Dexamethasone Sodium Phosphate  mAmin  25849   [] Therapeutic Activity  05298 [] Vasopneumatic cold with compression  66874    [] Gait Training   62827 [] Ultrasound   20880   [] Neuromuscular Re-education  15846 [] Electrical Stimulation Unattended  00969   [x] Manual Therapy  09586 [] Electrical Stimulation Attended  78061   [x] Instruction in HEP  [] Lumbar/Cervical Traction  00666   [] Aquatic Therapy   23493 [x] Cold/hotpack    [] Massage   95614      [] Dry Needling, 1 or 2 muscles  89475   [] Biofeedback, first 15 minutes   67949  [] Biofeedback, additional 15 minutes   18617 [] Dry Needling, 3 or more muscles  86424       Frequency:  2 x/week for 20 visits    Todays Treatment:  Manual: FRSL T2-4 Mob, R 1st rib MET, Supine C3-6 Side glide, MFR pect minor, UT, scalene, passive cervical retraction    Exercises:  Exercise Reps/ Time Weight/ Level Comments   Prone position in cervical extension x3'     Prone Cervical retraction x10     Seated cervical retraction x10                 Other:    Specific Instructions for next treatment: Manual, repeated cervical retraction and cervical retraction with extension      Evaluation Complexity:  History (Personal factors, comorbidities) [x] 0 [] 1-2 [] 3+   Exam (limitations, restrictions) [x] 1-2 [] 3 [] 4+   Clinical presentation (progression) [x] Stable [] Evolving  [] Unstable   Decision Making [x] Low [] Moderate [] High    [x] Low Complexity [] Moderate Complexity [] High Complexity       Treatment Charges: Mins Units   [x] Evaluation       [x]  Low       []  Moderate       []  High 15 1   []  Modalities     [x]  Ther Exercise 10 1   [x]  Manual Therapy 20 1   []  Ther Activities     []  Aquatics     []  Vasocompression     []  Other       TOTAL TREATMENT TIME: 45    Time in: 7737      Time out: 1320    Electronically signed by: Imer Rivera PT        Physician Signature:________________________________Date:__________________  By signing above or cosigning this note, I have reviewed this plan of care and certify a need for medically necessary rehabilitation services.      *PLEASE SIGN ABOVE AND FAX BACK ALL PAGES*

## 2021-10-12 ENCOUNTER — HOSPITAL ENCOUNTER (OUTPATIENT)
Dept: PHYSICAL THERAPY | Facility: CLINIC | Age: 41
Setting detail: THERAPIES SERIES
Discharge: HOME | End: 2021-10-12
Payer: COMMERCIAL

## 2021-10-12 PROCEDURE — 97110 THERAPEUTIC EXERCISES: CPT

## 2021-10-12 PROCEDURE — 97140 MANUAL THERAPY 1/> REGIONS: CPT

## 2021-10-12 NOTE — FLOWSHEET NOTE
[] [facility] [facility]  [address]  [phone]  F: [phone] [] [address]  [facility]   [address]  P: [phone]  F: [phone] [] [address]  [address]  P: [phone]  F: [phone] [] [address]  P: [phone]  F: [phone] [] [address]  [facility]   [address]   [place]: [phone]  F: [phone]      Physical Therapy Daily Treatment Note    Date:  10/12/2021  Patient Name:  Zak Mason    :  1980  MRN: [id number]  CIERA Hayes                         Insurance: [employer] (insurance pending)  Medical Diagnosis: Cervical radiculopathy              Rehab Codes: Onset Date: 9/15/21               Next 's appt.: unknown     Visit# / total visits:     Cancels/No Shows: 0    Subjective:    Pain:  [x] Yes  [] No Location: R neck and upper arm  Pain Rating: (0-10 scale) 4-5/10 intermitent  Pain altered Tx:  [] No  [] Yes  Action:  Comments: 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 Pt demonstrated a L sidelying with R lateral flexion position. States he also did chin tucks and hung off the end off his bed in prone demonstrating cervical flexion     Objective:  Modalities: Hot pack Cspine and tspine in supine x10 min  Manual:  Supine C3-6 Side glide, MFR UT, scalene, passive cervical retraction supine x30, passive cervical distraction with retraction and extension.    Prone manual cervical distraction with pt positioned in cervical extension, Seated cervical distraction with retraction     Exercises:  Exercise Reps/ Time Weight/ Level Comments   Prone position in cervical extension x3'       Prone Cervical retraction x10       Seated cervical retraction x10    with towel at C5-6    Blocking R C5-6 with hypothenar eminence and R lateral flexion  x20                 Other:     Specific Instructions for next treatment: Manual, repeated cervical retraction and cervical retraction with extension         Treatment Charges: Mins Units   []  Modalities     [x]  Ther Exercise 15 1   [x]  Manual Therapy 30 2   []  Ther Activities     []  Aquatics     []  Vasocompression     []  Other     Total Treatment time 45 3       Assessment: [x] Progressing toward goals. Pt arrived with decreased pain this date. Posture improved with less protraction of shoulders and decreased forward head posture. Pt has increased range with cervical retraction however if pushed to full retraction he gets peripheral symptoms in R arm to elbow. [] No change. [] Other:  [x] Patient would continue to benefit from skilled physical therapy services in order to: Centralize pain to neck and resolve as well as restore full cervical AROM all planes and correct     STG: (to be met in 10 treatments)  1. ? Pain:Centralize pain to neck and <3/10  2. ? ROM: Normal cervical ROM all planes  3. ? Function:Pt able to continue working with <3/10 pain  4. Patient to be independent with home exercise program as demonstrated by performance with correct form without cues.     LTG: (to be met in 20 treatments)  1. 0/10 pain to allow pt to perform all desired activities without restriction  2. FOM <10% functional limitation        Patient goals: Bring pain back to its source and resolve    Pt. Education:  [] Yes  [] No  [] Reviewed Prior HEP/Ed  Method of Education: [] Verbal  [] Demo  [] Written  Comprehension of Education:  [] Verbalizes understanding. [] Demonstrates understanding. [] Needs review. [] Demonstrates/verbalizes HEP/Ed previously given. Plan: [x] Continue current frequency toward long and short term goals.     [] Specific Instructions for subsequent treatments:       Time In:0930            Time Out: 1015    Electronically signed by:  Maddy Arteaga, PT

## 2021-10-14 ENCOUNTER — HOSPITAL ENCOUNTER (OUTPATIENT)
Dept: PHYSICAL THERAPY | Facility: CLINIC | Age: 41
Setting detail: THERAPIES SERIES
Discharge: HOME | End: 2021-10-14
Payer: COMMERCIAL

## 2021-10-14 PROCEDURE — 97110 THERAPEUTIC EXERCISES: CPT

## 2021-10-14 NOTE — FLOWSHEET NOTE
[] [facility] [facility]  [address]  [phone]  F: [phone] [] [address]  [facility]   [address]  P: [phone]  F: [phone] [x] [place]  [address]  P: [phone]  F: [phone] [] [address]  P: [phone]  F: [phone] [] [address]  [facility]   [address]   [place]: [phone]  F: [phone]      Physical Therapy Daily Treatment Note    Date:  10/14/2021  Patient Name:  Jovita Woodson    :  1980  MRN: [id number]  Physician: CIERA Bynum                         Insurance: [employer] (insurance pending)  Medical Diagnosis: Cervical radiculopathy              Rehab Codes: Onset Date: 9/15/21               Next 's appt.: unknown  Visit# / total visits: 3/20     Cancels/No Shows: 0/0    Subjective:    Pain:  [x] Yes  [] No Location: right UE Pain Rating: (0-10 scale) 0/10 at the moment  Pain altered Tx:  [] No  [] Yes  Action:  Comments: Felt more flared up after second visit compared to the first. He was able to sleep better after initial evaluation, but felt like right arm was Ashley" with him for a couple days after last session    Objective:  Manual: FRSL T2-4 Mob, R 1st rib MET, Supine C3-6 Side glide, MFR pect minor, UT, scalene, passive cervical retraction  Cervical distraction supine on 2 pillows, MFR UT, levator     Exercises:  Exercise Reps/ Time Weight/ Level Completed   UBE 6'  x         Prone position in cervical extension x3'       Prone Cervical retraction x10       Seated cervical retraction x10       Supine chin tuck   2 pillows to acacia.  HEP   x             Other:     Specific Instructions for next treatment: Manual, repeated cervical retraction in lessen gravity postion        Treatment Charges: Mins Units   []  Modalities     [x]  Ther Exercise 6 NC   [x]  Manual Therapy 20 1   []  Ther Activities     []  Aquatics     []  Vasocompression     []  Other     Total Treatment time 26 1       Assessment: [] Progressing toward goals. [] No change. [x] Other: 3/10 elbost post UBE. Manual cervical distraction completed on two pillows. Double leg lift to place LE on wedge provoked R UE symptoms. Gentle cervical distraction occasionally gave R UE relief but also could make right arm worse. Good response to soft tissue right upper trap. Discussed completing repeated cervical retraction to tolerance in supine position at this time to manage symptoms, educated on centralization of symptoms. Use of cervical roll while laying on left side for blocking. No symptoms cervical lateral flexion to right, improved ROM compared to last visit. [x] Patient would continue to benefit from skilled physical therapy services in order to: Centralize pain, increase ROM, increase function    STG: (to be met in 10 treatments)  1. ? Pain:Centralize pain to neck and <3/10  2. ? ROM: Normal cervical ROM all planes  3. ? Function:Pt able to continue working with <3/10 pain  4. Patient to be independent with home exercise program as demonstrated by performance with correct form without cues.     LTG: (to be met in 20 treatments)  1. 0/10 pain to allow pt to perform all desired activities without restriction  2. FOM <10% functional limitation        Patient goals: Bring pain back to its source and resolve    Pt. Education:  [x] Yes  [] No  [x] Reviewed Prior HEP/Ed  Method of Education: [x] Verbal  [] Demo  [] Written  Comprehension of Education:  [x] Verbalizes understanding. [x] Demonstrates understanding. [] Needs review. [] Demonstrates/verbalizes HEP/Ed previously given. Plan: [x] Continue current frequency toward long and short term goals.         Time In: 9:32 AM            Time Out: 10:15 AM    Electronically signed by:  Demetrice Mcclure PTA

## 2021-10-18 ENCOUNTER — APPOINTMENT (OUTPATIENT)
Dept: PHYSICAL THERAPY | Facility: CLINIC | Age: 41
End: 2021-10-18
Payer: COMMERCIAL

## 2021-10-21 ENCOUNTER — APPOINTMENT (OUTPATIENT)
Dept: PHYSICAL THERAPY | Facility: CLINIC | Age: 41
End: 2021-10-21
Payer: COMMERCIAL

## 2022-03-14 RX ORDER — SERTRALINE HYDROCHLORIDE 100 MG/1
100 TABLET, FILM COATED ORAL EVERY EVENING
Qty: 30 TABLET | Refills: 3 | OUTPATIENT
Start: 2022-03-14